# Patient Record
Sex: MALE | Race: BLACK OR AFRICAN AMERICAN | Employment: FULL TIME | URBAN - METROPOLITAN AREA
[De-identification: names, ages, dates, MRNs, and addresses within clinical notes are randomized per-mention and may not be internally consistent; named-entity substitution may affect disease eponyms.]

---

## 2018-04-15 ENCOUNTER — HOSPITAL ENCOUNTER (EMERGENCY)
Age: 48
Discharge: HOME | End: 2018-04-15
Attending: EMERGENCY MEDICINE
Payer: SELF-PAY

## 2018-04-15 ENCOUNTER — APPOINTMENT (OUTPATIENT)
Dept: GENERAL RADIOLOGY | Age: 48
End: 2018-04-15
Attending: EMERGENCY MEDICINE
Payer: SELF-PAY

## 2018-04-15 VITALS
SYSTOLIC BLOOD PRESSURE: 124 MMHG | OXYGEN SATURATION: 96 % | HEART RATE: 74 BPM | RESPIRATION RATE: 18 BRPM | DIASTOLIC BLOOD PRESSURE: 74 MMHG

## 2018-04-15 DIAGNOSIS — J45.20 MILD INTERMITTENT ASTHMATIC BRONCHITIS WITHOUT COMPLICATION: Primary | ICD-10-CM

## 2018-04-15 PROCEDURE — 94640 AIRWAY INHALATION TREATMENT: CPT

## 2018-04-15 PROCEDURE — 74011250637 HC RX REV CODE- 250/637: Performed by: EMERGENCY MEDICINE

## 2018-04-15 PROCEDURE — 96374 THER/PROPH/DIAG INJ IV PUSH: CPT

## 2018-04-15 PROCEDURE — 99285 EMERGENCY DEPT VISIT HI MDM: CPT

## 2018-04-15 PROCEDURE — 74011000250 HC RX REV CODE- 250: Performed by: EMERGENCY MEDICINE

## 2018-04-15 PROCEDURE — 77030029684 HC NEB SM VOL KT MONA -A

## 2018-04-15 PROCEDURE — 96361 HYDRATE IV INFUSION ADD-ON: CPT

## 2018-04-15 PROCEDURE — 74011250636 HC RX REV CODE- 250/636: Performed by: EMERGENCY MEDICINE

## 2018-04-15 PROCEDURE — 74011636637 HC RX REV CODE- 636/637: Performed by: EMERGENCY MEDICINE

## 2018-04-15 PROCEDURE — 71046 X-RAY EXAM CHEST 2 VIEWS: CPT

## 2018-04-15 RX ORDER — MAGNESIUM SULFATE HEPTAHYDRATE 500 MG/ML
2 INJECTION, SOLUTION INTRAMUSCULAR; INTRAVENOUS
Status: COMPLETED | OUTPATIENT
Start: 2018-04-15 | End: 2018-04-15

## 2018-04-15 RX ORDER — ALBUTEROL SULFATE 90 UG/1
1 AEROSOL, METERED RESPIRATORY (INHALATION)
Qty: 1 INHALER | Refills: 0 | Status: SHIPPED | OUTPATIENT
Start: 2018-04-15 | End: 2022-08-03

## 2018-04-15 RX ORDER — PREDNISONE 20 MG/1
60 TABLET ORAL
Status: COMPLETED | OUTPATIENT
Start: 2018-04-15 | End: 2018-04-15

## 2018-04-15 RX ORDER — PREDNISONE 50 MG/1
50 TABLET ORAL DAILY
Qty: 5 TAB | Refills: 0 | Status: SHIPPED | OUTPATIENT
Start: 2018-04-15 | End: 2018-04-20

## 2018-04-15 RX ORDER — ALBUTEROL SULFATE 0.83 MG/ML
2.5 SOLUTION RESPIRATORY (INHALATION)
Status: COMPLETED | OUTPATIENT
Start: 2018-04-15 | End: 2018-04-15

## 2018-04-15 RX ORDER — IPRATROPIUM BROMIDE AND ALBUTEROL SULFATE 2.5; .5 MG/3ML; MG/3ML
3 SOLUTION RESPIRATORY (INHALATION) ONCE
Status: COMPLETED | OUTPATIENT
Start: 2018-04-15 | End: 2018-04-15

## 2018-04-15 RX ORDER — GUAIFENESIN 100 MG/5ML
200 SOLUTION ORAL
Status: COMPLETED | OUTPATIENT
Start: 2018-04-15 | End: 2018-04-15

## 2018-04-15 RX ADMIN — ALBUTEROL SULFATE 2.5 MG: 2.5 SOLUTION RESPIRATORY (INHALATION) at 05:42

## 2018-04-15 RX ADMIN — ALBUTEROL SULFATE 2.5 MG: 2.5 SOLUTION RESPIRATORY (INHALATION) at 05:27

## 2018-04-15 RX ADMIN — IPRATROPIUM BROMIDE AND ALBUTEROL SULFATE 3 ML: .5; 3 SOLUTION RESPIRATORY (INHALATION) at 05:10

## 2018-04-15 RX ADMIN — PREDNISONE 60 MG: 20 TABLET ORAL at 05:10

## 2018-04-15 RX ADMIN — GUAIFENESIN 200 MG: 200 SOLUTION ORAL at 06:44

## 2018-04-15 RX ADMIN — SODIUM CHLORIDE 1000 ML: 900 INJECTION, SOLUTION INTRAVENOUS at 07:29

## 2018-04-15 RX ADMIN — MAGNESIUM SULFATE HEPTAHYDRATE 2 G: 500 INJECTION, SOLUTION INTRAMUSCULAR; INTRAVENOUS at 07:29

## 2018-04-15 NOTE — ED NOTES
Pt asleep; rouses easily to voice. Reports \"I feel good\". Respirations regular/non labored, skin warm/dry. Denies shortness of breath at this time.

## 2018-04-15 NOTE — ED NOTES
Assumed care of patient. No ss distress. Resting comfortably on stretcher, wife at side. Patient awoken for IV and fluid administration. States feeling better. Warm blanket provided. Will continue to monitor closely while awaiting further orders.

## 2018-04-15 NOTE — ED PROVIDER NOTES
EMERGENCY DEPARTMENT HISTORY AND PHYSICAL EXAM    5:00 AM      Date: 4/15/2018  Patient Name: Sue Ferrara    History of Presenting Illness     Chief Complaint   Patient presents with    Shortness of Breath         History Provided By: Patient    Chief Complaint: SOB/wheezing  Duration:  Days  Timing:  Acute  Location: n/a  Quality: n/a  Severity: Mild  Modifying Factors: No worsening or alleviating factors. Pt tried nothing for this PTA. Associated Symptoms: denies any other associated signs or symptoms      Additional History (Context): Sue Ferrara is a 51 y.o. male with No significant past medical history who presents with SOB/wheezing. Pt reports mild acute onset of SOB/wheezing yesterday at 9AM. No worsening or alleviating factors. Pt tried nothing for this PTA. Pt denies fevers, chills, chest pain, Hx of blood clots, recent long trips, Hx of asthma, cigarette abuse, fevers, chills, and any other Sx or complaints at this time. PCP: None    Current Outpatient Prescriptions   Medication Sig Dispense Refill    GUAIFENESIN (MUCINEX PO) Take  by mouth. Past History     Past Medical History:  History reviewed. No pertinent past medical history. Past Surgical History:  Past Surgical History:   Procedure Laterality Date    HX ORTHOPAEDIC      right lower extremity procedure       Family History:  History reviewed. No pertinent family history. Social History:  Social History   Substance Use Topics    Smoking status: Never Smoker    Smokeless tobacco: None    Alcohol use No       Allergies:  No Known Allergies      Review of Systems       Review of Systems   Constitutional: Negative. Negative for chills and fever. HENT: Negative. Eyes: Negative. Respiratory: Positive for shortness of breath and wheezing. Cardiovascular: Negative. Gastrointestinal: Negative. Endocrine: Negative. Genitourinary: Negative. Musculoskeletal: Negative. Skin: Negative. Allergic/Immunologic: Negative. Neurological: Negative. Hematological: Negative. Psychiatric/Behavioral: Negative. All other systems reviewed and are negative. Physical Exam     Visit Vitals    /77 (BP 1 Location: Left arm, BP Patient Position: At rest)    Pulse 74    Resp 22    SpO2 93%         Physical Exam   Constitutional: He is oriented to person, place, and time. He appears well-developed and well-nourished. No distress. HENT:   Head: Normocephalic. Mouth/Throat: Oropharynx is clear and moist.   Eyes: Conjunctivae and EOM are normal. Pupils are equal, round, and reactive to light. Neck: Normal range of motion. Neck supple. Cardiovascular: Normal rate, regular rhythm, normal heart sounds and intact distal pulses. No murmur heard. Pulmonary/Chest: Effort normal. No respiratory distress. He has wheezes. He has rhonchi. He has no rales. He exhibits no tenderness. Abdominal: Soft. Bowel sounds are normal. He exhibits no distension. There is no tenderness. There is no rebound. Musculoskeletal: Normal range of motion. He exhibits no edema or tenderness. Neurological: He is alert and oriented to person, place, and time. No cranial nerve deficit. He exhibits normal muscle tone. Coordination normal.   Skin: Skin is warm and dry. No rash noted. Psychiatric: He has a normal mood and affect. His behavior is normal. Judgment and thought content normal.   Nursing note and vitals reviewed. Diagnostic Study Results     Labs -  No results found for this or any previous visit (from the past 12 hour(s)). Radiologic Studies -   XR CHEST PA LAT   Final Result   IMPRESSION:     No evidence of acute pulmonary disease . Medical Decision Making   I am the first provider for this patient. I reviewed the vital signs, available nursing notes, past medical history, past surgical history, family history and social history.     Vital Signs-Reviewed the patient's vital signs. Records Reviewed: Nursing Notes (Time of Review: 5:00 AM)    ED Course: Progress Notes, Reevaluation, and Consults:  6:57 AM pt remains in room awaiting improvement of Sx's after treatment. Provider Notes (Medical Decision Making): asthma, bronchitis, Pneumonia, Viral Syndrome    Diagnosis     Clinical Impression:   1. Mild intermittent asthmatic bronchitis without complication        Disposition: TBD    Follow-up Information     None           Patient's Medications   Start Taking    No medications on file   Continue Taking    GUAIFENESIN (MUCINEX PO)    Take  by mouth. These Medications have changed    No medications on file   Stop Taking    BUTALBITAL-ACETAMINOPHEN-CAFFEINE (FIORICET) -40 MG PER TABLET    Take 1-2 tablets by mouth every 4 hours as needed for headache. Maximum dose 6 capsules daily. _______________________________    Attestations:  Scribe Attestation     Serita Osler acting as a scribe for and in the presence of Cheryl Frank MD      April 15, 2018 at 5:00 AM       Provider Attestation:      I personally performed the services described in the documentation, reviewed the documentation, as recorded by the scribe in my presence, and it accurately and completely records my words and actions. April 15, 2018 at 5:00 AM - Cheryl Frank MD    _______________________________    6:51 AM : Pt care transferred to Dr. Jaymie Rehman  ,ED provider. History of patient complaint(s), available diagnostic reports and current treatment plan has been discussed thoroughly. Bedside rounding on patient occured : yes .   Intended disposition of patient : TBD  Pending diagnostics reports and/or labs (please list): improvement of Sx's

## 2018-04-15 NOTE — ED NOTES
Verbal and bedside report given to Erma Dexter RN. Pt hx/complaint/findings/treatments/current plan of care have been discussed. Shift change performed at bedside; pt and spouse awre of staff change over and have no further questions at this time.

## 2018-04-15 NOTE — ED NOTES
7:00 AM : Pt care transferred from Dr. Wilner Nunn, ED provider. History of patient complaint(s), available diagnostic reports and current treatment plan has been discussed thoroughly. .  Intended disposition of patient : TBD  Pending diagnostics reports and/or labs (please list): Reeval    8:31 AM Pt reassessed and feeling better. Pt was taken off O2, if Sats remain reassuring, will discharge. I have reassessed the patient. Patient is feeling better and O2 sat reassuring. Patient will be prescribed Prednisone and Proair. Patient was discharged in stable condition. Patient is to return to emergency department if any new or worsening condition. Scribe Attestation     Devin De La Fuente acting as a scribe for and in the presence of Santy Ordaz DO     April 15, 2018 at 7:28 AM       Provider Attestation:      I personally performed the services described in the documentation, reviewed the documentation, as recorded by the scribe in my presence, and it accurately and completely records my words and actions.  April 15, 2018 at 7:28 AM - Santy Ordaz DO

## 2018-04-15 NOTE — DISCHARGE INSTRUCTIONS
Bronchitis: Care Instructions  Your Care Instructions    Bronchitis is inflammation of the bronchial tubes, which carry air to the lungs. The tubes swell and produce mucus, or phlegm. The mucus and inflamed bronchial tubes make you cough. You may have trouble breathing. Most cases of bronchitis are caused by viruses like those that cause colds. Antibiotics usually do not help and they may be harmful. Bronchitis usually develops rapidly and lasts about 2 to 3 weeks in otherwise healthy people. Follow-up care is a key part of your treatment and safety. Be sure to make and go to all appointments, and call your doctor if you are having problems. It's also a good idea to know your test results and keep a list of the medicines you take. How can you care for yourself at home? · Take all medicines exactly as prescribed. Call your doctor if you think you are having a problem with your medicine. · Get some extra rest.  · Take an over-the-counter pain medicine, such as acetaminophen (Tylenol), ibuprofen (Advil, Motrin), or naproxen (Aleve) to reduce fever and relieve body aches. Read and follow all instructions on the label. · Do not take two or more pain medicines at the same time unless the doctor told you to. Many pain medicines have acetaminophen, which is Tylenol. Too much acetaminophen (Tylenol) can be harmful. · Take an over-the-counter cough medicine that contains dextromethorphan to help quiet a dry, hacking cough so that you can sleep. Avoid cough medicines that have more than one active ingredient. Read and follow all instructions on the label. · Breathe moist air from a humidifier, hot shower, or sink filled with hot water. The heat and moisture will thin mucus so you can cough it out. · Do not smoke. Smoking can make bronchitis worse. If you need help quitting, talk to your doctor about stop-smoking programs and medicines. These can increase your chances of quitting for good.   When should you call for help? Call 911 anytime you think you may need emergency care. For example, call if:  ? · You have severe trouble breathing. ?Call your doctor now or seek immediate medical care if:  ? · You have new or worse trouble breathing. ? · You cough up dark brown or bloody mucus (sputum). ? · You have a new or higher fever. ? · You have a new rash. ? Watch closely for changes in your health, and be sure to contact your doctor if:  ? · You cough more deeply or more often, especially if you notice more mucus or a change in the color of your mucus. ? · You are not getting better as expected. Where can you learn more? Go to http://salome-amos.info/. Enter H333 in the search box to learn more about \"Bronchitis: Care Instructions. \"  Current as of: May 12, 2017  Content Version: 11.4  © 2910-1313 [employer]. Care instructions adapted under license by [employer] (which disclaims liability or warranty for this information). If you have questions about a medical condition or this instruction, always ask your healthcare professional. [address] any warranty or liability for your use of this information.

## 2022-01-03 ENCOUNTER — APPOINTMENT (OUTPATIENT)
Dept: GENERAL RADIOLOGY | Age: 52
End: 2022-01-03
Attending: PHYSICIAN ASSISTANT
Payer: COMMERCIAL

## 2022-01-03 ENCOUNTER — HOSPITAL ENCOUNTER (EMERGENCY)
Age: 52
Discharge: HOME | End: 2022-01-03
Attending: STUDENT IN AN ORGANIZED HEALTH CARE EDUCATION/TRAINING PROGRAM
Payer: COMMERCIAL

## 2022-01-03 VITALS
RESPIRATION RATE: 18 BRPM | OXYGEN SATURATION: 100 % | TEMPERATURE: 98.7 F | HEIGHT: 73 IN | WEIGHT: 280 LBS | SYSTOLIC BLOOD PRESSURE: 149 MMHG | DIASTOLIC BLOOD PRESSURE: 98 MMHG | BODY MASS INDEX: 37.11 KG/M2 | HEART RATE: 74 BPM

## 2022-01-03 DIAGNOSIS — R07.9 CHEST PAIN, UNSPECIFIED TYPE: Primary | ICD-10-CM

## 2022-01-03 DIAGNOSIS — I15.9 SECONDARY HYPERTENSION: ICD-10-CM

## 2022-01-03 DIAGNOSIS — U07.1 COVID-19: ICD-10-CM

## 2022-01-03 DIAGNOSIS — R77.8 ELEVATED TROPONIN: ICD-10-CM

## 2022-01-03 LAB
ALBUMIN SERPL-MCNC: 3.6 G/DL (ref 3.4–5)
ALBUMIN/GLOB SERPL: 0.9 {RATIO} (ref 0.8–1.7)
ALP SERPL-CCNC: 98 U/L (ref 45–117)
ALT SERPL-CCNC: 50 U/L (ref 16–61)
ANION GAP SERPL CALC-SCNC: 5 MMOL/L (ref 3–18)
AST SERPL-CCNC: 28 U/L (ref 10–38)
ATRIAL RATE: 72 BPM
BASOPHILS # BLD: 0 K/UL (ref 0–0.1)
BASOPHILS NFR BLD: 1 % (ref 0–2)
BILIRUB SERPL-MCNC: 0.5 MG/DL (ref 0.2–1)
BNP SERPL-MCNC: 9 PG/ML (ref 0–900)
BUN SERPL-MCNC: 14 MG/DL (ref 7–18)
BUN/CREAT SERPL: 13 (ref 12–20)
CALCIUM SERPL-MCNC: 8.6 MG/DL (ref 8.5–10.1)
CALCULATED P AXIS, ECG09: -9 DEGREES
CALCULATED R AXIS, ECG10: -49 DEGREES
CALCULATED T AXIS, ECG11: 52 DEGREES
CHLORIDE SERPL-SCNC: 108 MMOL/L (ref 100–111)
CO2 SERPL-SCNC: 26 MMOL/L (ref 21–32)
CREAT SERPL-MCNC: 1.12 MG/DL (ref 0.6–1.3)
DIAGNOSIS, 93000: NORMAL
DIFFERENTIAL METHOD BLD: ABNORMAL
EOSINOPHIL # BLD: 0.4 K/UL (ref 0–0.4)
EOSINOPHIL NFR BLD: 7 % (ref 0–5)
ERYTHROCYTE [DISTWIDTH] IN BLOOD BY AUTOMATED COUNT: 13.2 % (ref 11.6–14.5)
FLUAV RNA SPEC QL NAA+PROBE: NOT DETECTED
FLUBV RNA SPEC QL NAA+PROBE: NOT DETECTED
GLOBULIN SER CALC-MCNC: 4.2 G/DL (ref 2–4)
GLUCOSE SERPL-MCNC: 108 MG/DL (ref 74–99)
HCT VFR BLD AUTO: 47.6 % (ref 36–48)
HGB BLD-MCNC: 16.1 G/DL (ref 13–16)
IMM GRANULOCYTES # BLD AUTO: 0 K/UL (ref 0–0.04)
IMM GRANULOCYTES NFR BLD AUTO: 0 % (ref 0–0.5)
LYMPHOCYTES # BLD: 2.4 K/UL (ref 0.9–3.6)
LYMPHOCYTES NFR BLD: 44 % (ref 21–52)
MCH RBC QN AUTO: 29.4 PG (ref 24–34)
MCHC RBC AUTO-ENTMCNC: 33.8 G/DL (ref 31–37)
MCV RBC AUTO: 87 FL (ref 78–100)
MONOCYTES # BLD: 0.4 K/UL (ref 0.05–1.2)
MONOCYTES NFR BLD: 8 % (ref 3–10)
NEUTS SEG # BLD: 2.2 K/UL (ref 1.8–8)
NEUTS SEG NFR BLD: 41 % (ref 40–73)
NRBC # BLD: 0 K/UL (ref 0–0.01)
NRBC BLD-RTO: 0 PER 100 WBC
P-R INTERVAL, ECG05: 154 MS
PLATELET # BLD AUTO: 221 K/UL (ref 135–420)
PMV BLD AUTO: 11.2 FL (ref 9.2–11.8)
POTASSIUM SERPL-SCNC: 4 MMOL/L (ref 3.5–5.5)
PROT SERPL-MCNC: 7.8 G/DL (ref 6.4–8.2)
Q-T INTERVAL, ECG07: 406 MS
QRS DURATION, ECG06: 106 MS
QTC CALCULATION (BEZET), ECG08: 444 MS
RBC # BLD AUTO: 5.47 M/UL (ref 4.35–5.65)
SARS-COV-2, COV2: DETECTED
SODIUM SERPL-SCNC: 139 MMOL/L (ref 136–145)
TROPONIN-HIGH SENSITIVITY: 226 NG/L (ref 0–78)
TROPONIN-HIGH SENSITIVITY: 227 NG/L (ref 0–78)
VENTRICULAR RATE, ECG03: 72 BPM
WBC # BLD AUTO: 5.5 K/UL (ref 4.6–13.2)

## 2022-01-03 PROCEDURE — 83880 ASSAY OF NATRIURETIC PEPTIDE: CPT

## 2022-01-03 PROCEDURE — 74011000250 HC RX REV CODE- 250: Performed by: STUDENT IN AN ORGANIZED HEALTH CARE EDUCATION/TRAINING PROGRAM

## 2022-01-03 PROCEDURE — 84484 ASSAY OF TROPONIN QUANT: CPT

## 2022-01-03 PROCEDURE — 99284 EMERGENCY DEPT VISIT MOD MDM: CPT

## 2022-01-03 PROCEDURE — 80053 COMPREHEN METABOLIC PANEL: CPT

## 2022-01-03 PROCEDURE — 85025 COMPLETE CBC W/AUTO DIFF WBC: CPT

## 2022-01-03 PROCEDURE — 71045 X-RAY EXAM CHEST 1 VIEW: CPT

## 2022-01-03 PROCEDURE — 87636 SARSCOV2 & INF A&B AMP PRB: CPT

## 2022-01-03 PROCEDURE — 93005 ELECTROCARDIOGRAM TRACING: CPT

## 2022-01-03 PROCEDURE — 74011250637 HC RX REV CODE- 250/637: Performed by: STUDENT IN AN ORGANIZED HEALTH CARE EDUCATION/TRAINING PROGRAM

## 2022-01-03 RX ORDER — AMLODIPINE BESYLATE 5 MG/1
5 TABLET ORAL DAILY
Qty: 14 TABLET | Refills: 0 | Status: SHIPPED | OUTPATIENT
Start: 2022-01-03 | End: 2022-01-17

## 2022-01-03 RX ORDER — GUAIFENESIN 100 MG/5ML
81 LIQUID (ML) ORAL DAILY
Qty: 14 TABLET | Refills: 0 | Status: SHIPPED | OUTPATIENT
Start: 2022-01-03 | End: 2022-01-17

## 2022-01-03 RX ORDER — ASPIRIN 325 MG
325 TABLET ORAL
Status: COMPLETED | OUTPATIENT
Start: 2022-01-03 | End: 2022-01-03

## 2022-01-03 RX ADMIN — LIDOCAINE HYDROCHLORIDE 40 ML: 20 SOLUTION ORAL; TOPICAL at 13:13

## 2022-01-03 RX ADMIN — ASPIRIN 325 MG ORAL TABLET 325 MG: 325 PILL ORAL at 13:13

## 2022-01-03 NOTE — Clinical Note
[address] [facility] EMERGENCY DEPT  [id number] [id number]  [phone]    Work/School Note    Date: 1/3/2022     To Whom It May concern:    Isak Mays was evaluated by the following provider(s):  Attending Provider: Grace Silverio DO.   COVID19 virus is suspected. Per the CDC guidelines we recommend home isolation until the following conditions are all met:    1. At least five days have passed since symptoms first appeared and/or had a close exposure,   2. After home isolation for five days, wearing a mask around others for the next five days,  3. At least 24 have passed since last fever without the use of fever-reducing medications and  4.  Symptoms (eg cough, shortness of breath) have improved      Sincerely,          Akash Lopez DO

## 2022-01-03 NOTE — ED NOTES
Pt chest pain is no longer present at this time. Pt troponin noted to be elevated, per provider, repeat Trop & EKG at 2 hour krystal which will be 1240.

## 2022-01-03 NOTE — Clinical Note
[address] [facility] EMERGENCY DEPT  [id number] [id number] [phone]    Work/School Note    Date: 1/3/2022     To Whom It May concern:    Cosme Novoa was evaluated by the following provider(s):  Attending Provider: Zia Kilgore DO.   COVID19 virus is suspected. Per the CDC guidelines we recommend home isolation until the following conditions are all met:    1. At least five days have passed since symptoms first appeared and/or had a close exposure,   2. After home isolation for five days, wearing a mask around others for the next five days,  3. At least 24 have passed since last fever without the use of fever-reducing medications and  4.  Symptoms (eg cough, shortness of breath) have improved      Sincerely,          Akash Lopez DO

## 2022-01-03 NOTE — ED NOTES
Repeat EKG performed and repeat troponin drawn. Pt updated on poc. He reports chest pain resolved. Call bell in reach.

## 2022-01-03 NOTE — DISCHARGE INSTRUCTIONS
You tested positive for COVID-19. Additionally, you had chest pain that resolved in the ER. Your heart enzymes were mildly elevated therefore we will start you on a baby aspirin daily and also a blood pressure medication until you follow-up with a cardiologist.  Instructions to follow-up with a cardiologist at them provided to you. Return to the ER immediately if your chest pain returns or worsens or you have any other complaints. Please take isolation/quarantine precautions as listed below.

## 2022-01-03 NOTE — ED TRIAGE NOTES
Pt reports feeling like \"I was catching a cold on Thursday\", reports left sided chest pain that increases with palpation. Chest pain does not radiate. Pt reports dizziness, headache, neck pain. BP noted to be elevated at this time, provider at bedside. Pt denies fever. Pt denies n/v/d, abd pain. Pt is a/o x 4 with no neuro deficits noted. Pt is in gown, EKG has been performed and given to provider. Pt updated on poc.

## 2022-01-03 NOTE — ED NOTES
EMERGENCY DEPARTMENT HISTORY AND PHYSICAL EXAM      Patient Name: Harrison Blevins    History of Presenting Illness     HPI:     79-year-old male with no medical history presents to the ED for evaluation of URI-like symptoms that have been ongoing since Thursday including sinus and chest congestion, runny nose, cough, shortness of breath. Additionally, he had an episode of substernal chest pain described as both dull and sharp that began on Thursday, waxed and waned throughout the weekend and yesterday it was there constantly. Aggravated with palpation of his right anterior chest.  No alleviating factors. Denies any radiating pain, diaphoresis, nausea, vomiting, lightheadedness, back pain. Of note, patient does not use any tobacco products or illicit drugs and has no significant family history of cardiac disease. He states he takes no medications and [facility] so [facility] can assess him to start him on antihypertensives. PCP: None    No current facility-administered medications on file prior to encounter. Current Outpatient Medications on File Prior to Encounter   Medication Sig Dispense Refill    GUAIFENESIN (MUCINEX PO) Take  by mouth.  albuterol (PROAIR HFA) 90 mcg/actuation inhaler Take 1 Puff by inhalation every six (6) hours as needed for Wheezing. 1 Inhaler 0       Past History     Past Medical History:  No past medical history on file. Past Surgical History:  Past Surgical History:   Procedure Laterality Date    HX ORTHOPAEDIC      right lower extremity procedure       Family History:  No family history on file. Social History:  Social History     Tobacco Use    Smoking status: Never Smoker    Smokeless tobacco: Not on file   Substance Use Topics    Alcohol use: No    Drug use: No       Allergies:  No Known Allergies    Review of Nursing Notes: I have reviewed the relevant nursing notes that were available at the time of this entry. Portions of the Family and Social history, as well as medications and allergies, may have been entered into my documentation by nursing or other ancillary staff; I have confirmed and may have supplemented that information to the best of my ability at the time the note was reviewed. There are some disagreements between the nursing notes and my evaluation at times. Some of the above referenced nursing documentation appears in my note after completion of my review. Review of Systems     CONSTITUTIONAL: Denies fevers, chills, sweats, or weight changes. EYES: Denies any visual changes or symptoms  HENT: Denies headaches, changes to hearing, sore throat, dysphagia, or change in voice. CV: Denies palpitations. Lungs/Chest: Denies wheezing, or cough. GI: Denies nausea, vomiting, diarrhea, constipation, abdominal pain, hematochezia, or melena. : Denies urinary retention or incontinence. No genital discharge. No dysuria. MSK: Denies myalgias or arthralgias. NEURO: Denies chronic headaches or seizures. No numbness, tingling or weakness. PSYCHIATRIC: Denies problems with mood disturbance and anxiety. DERMATOLOGIC: Denies any rashes or skin changes. Physical Exam     General: In no apparent distress. Well-nourished/well-developed. Head/Neck: Normocephalic, atraumatic. Nontender midline neck, normal ROM. EENT: PERRLA. EOM intact bilaterally. Oropharyngeal mucosa is moist, lesions or erythema. No nasal discharge. Cardiovascular: RRR, no murmurs, gallops, or rubs. Peripheral pulses normal and intact in BUE and BLE. Lungs/Chest: CTAB, no wheezing, rhonchi, or rales. Point tenderness to right costosternal junction. Abdomen: No distention. No organomegaly. No rebound, rigidity, or guarding. Nontender. Extremities/Skin: Warm distal extremities No deformities. No edema. No rashes. Neuro: A&O x 3. Grossly intact sensations and motor function in upper and lower extremities bilaterally.   Psych: Appropriate mood and affect. Diagnostic Study Results     Labs -     Recent Results (from the past 12 hour(s))   EKG, 12 LEAD, INITIAL    Collection Time: 01/03/22 12:19 PM   Result Value Ref Range    Ventricular Rate 72 BPM    Atrial Rate 72 BPM    P-R Interval 154 ms    QRS Duration 106 ms    Q-T Interval 406 ms    QTC Calculation (Bezet) 444 ms    Calculated P Axis -9 degrees    Calculated R Axis -49 degrees    Calculated T Axis 52 degrees    Diagnosis       Normal sinus rhythm  Incomplete right bundle branch block  Left anterior fascicular block  Minimal voltage criteria for LVH, may be normal variant  Abnormal ECG  When compared with ECG of 27-JUN-2016 08:40,  Nonspecific T wave abnormality has replaced inverted T waves in Inferior   leads  Nonspecific T wave abnormality now evident in Lateral leads  Confirmed by Filiberto Louise MD, --- ([id number]) on 1/3/2022 1:56:02 PM     CBC WITH AUTOMATED DIFF    Collection Time: 01/03/22 12:40 PM   Result Value Ref Range    WBC 5.5 4.6 - 13.2 K/uL    RBC 5.47 4.35 - 5.65 M/uL    HGB 16.1 (H) 13.0 - 16.0 g/dL    HCT 47.6 36.0 - 48.0 %    MCV 87.0 78.0 - 100.0 FL    MCH 29.4 24.0 - 34.0 PG    MCHC 33.8 31.0 - 37.0 g/dL    RDW 13.2 11.6 - 14.5 %    PLATELET 500 888 - 715 K/uL    MPV 11.2 9.2 - 11.8 FL    NRBC 0.0 0  WBC    ABSOLUTE NRBC 0.00 0.00 - 0.01 K/uL    NEUTROPHILS 41 40 - 73 %    LYMPHOCYTES 44 21 - 52 %    MONOCYTES 8 3 - 10 %    EOSINOPHILS 7 (H) 0 - 5 %    BASOPHILS 1 0 - 2 %    IMMATURE GRANULOCYTES 0 0.0 - 0.5 %    ABS. NEUTROPHILS 2.2 1.8 - 8.0 K/UL    ABS. LYMPHOCYTES 2.4 0.9 - 3.6 K/UL    ABS. MONOCYTES 0.4 0.05 - 1.2 K/UL    ABS. EOSINOPHILS 0.4 0.0 - 0.4 K/UL    ABS. BASOPHILS 0.0 0.0 - 0.1 K/UL    ABS. IMM.  GRANS. 0.0 0.00 - 0.04 K/UL    DF AUTOMATED     METABOLIC PANEL, COMPREHENSIVE    Collection Time: 01/03/22 12:40 PM   Result Value Ref Range    Sodium 139 136 - 145 mmol/L    Potassium 4.0 3.5 - 5.5 mmol/L    Chloride 108 100 - 111 mmol/L    CO2 26 21 - 32 mmol/L Anion gap 5 3.0 - 18 mmol/L    Glucose 108 (H) 74 - 99 mg/dL    BUN 14 7.0 - 18 MG/DL    Creatinine 1.12 0.6 - 1.3 MG/DL    BUN/Creatinine ratio 13 12 - 20      GFR est AA >60 >60 ml/min/1.73m2    GFR est non-AA >60 >60 ml/min/1.73m2    Calcium 8.6 8.5 - 10.1 MG/DL    Bilirubin, total 0.5 0.2 - 1.0 MG/DL    ALT (SGPT) 50 16 - 61 U/L    AST (SGOT) 28 10 - 38 U/L    Alk. phosphatase 98 45 - 117 U/L    Protein, total 7.8 6.4 - 8.2 g/dL    Albumin 3.6 3.4 - 5.0 g/dL    Globulin 4.2 (H) 2.0 - 4.0 g/dL    A-G Ratio 0.9 0.8 - 1.7     NT-PRO BNP    Collection Time: 01/03/22 12:40 PM   Result Value Ref Range    NT pro-BNP 9 0 - 900 PG/ML   TROPONIN-HIGH SENSITIVITY    Collection Time: 01/03/22 12:40 PM   Result Value Ref Range    Troponin-High Sensitivity 227 (HH) 0 - 78 ng/L   COVID-19 WITH INFLUENZA A/B    Collection Time: 01/03/22 12:45 PM   Result Value Ref Range    SARS-CoV-2 Detected (AA) NOTD      Influenza A by PCR Not detected NOTD      Influenza B by PCR Not detected NOTD     TROPONIN-HIGH SENSITIVITY    Collection Time: 01/03/22  2:40 PM   Result Value Ref Range    Troponin-High Sensitivity 226 (HH) 0 - 78 ng/L       Radiologic Studies -   XR CHEST PORT   Final Result      Slightly prominent perihilar interstitium may be simulated by low lung volumes,   but mild infiltrate/edema not excluded. CT Results  (Last 48 hours)    None        CXR Results  (Last 48 hours)               01/03/22 1246  XR CHEST PORT Final result    Impression:      Slightly prominent perihilar interstitium may be simulated by low lung volumes,   but mild infiltrate/edema not excluded. Narrative:  EXAMINATION: Chest single view       INDICATION: Shortness of breath, hypertension       COMPARISON: 4/15/2018       FINDINGS: Single frontal view of the chest obtained. Mediastinal silhouette   normal in size. Slightly prominent perihilar interstitium. No confluent   consolidation. No evidence of pneumothorax.  No acute osseous findings. Low lung   volumes and underpenetration limits evaluation. Medical Decision Making     I am the first provider for this patient. Vital Signs- I personally reviewed and interpreted the patient's vital signs. Patient Vitals for the past 12 hrs:   Temp Pulse Resp BP SpO2   01/03/22 1234  78 12 (!) 154/95 100 %   01/03/22 1232 98.7 °F (37.1 °C) 75 20 (!) 163/102 99 %     EKG interpretation: Normal sinus rhythm. Incomplete RBBB. Rate 72. No STEMI. Nonspecific ST/T changes. No significant change from prior EKG. EKG read and interpreted by ED physician at 21 [phone]    Records Reviewed: I reviewed the patient's records to interpret any previous medical data available to me including EKGs, previous medical records, previous images, previous labs. Provider Notes (Medical Decision Making):     58-year-old male presents to the ED for URI-like symptoms with chest congestion, runny nose, cough and shortness of breath since Thursday. Additionally, he has been having waxing/waning substernal chest pressure since Thursday however has been constant since yesterday. Nonexertional.     Upon my examination, patient is afebrile and overall well appearing. Hemodynamically normal. Neurovascularly intact. Point tenderness to right anterior chest wall at costosternal junction. Given today's clinical picture, my differential diagnoses indlude: Musculoskeletal pain, viral illness, COVID-19, pneumonia, pneumothorax, ACS, arrhythmia, anemia, dehydration, anxiety, electrolyte abnormality. To further refine my diagnosis, I will order EKG, CXR, labs including cardiac enzymes, COVID-19 swab. ED Course:     ED Course as of 01/03/22 1555   Mon Jan 03, 2022   1335 Troponin 227. Plan for delta troponin with EKG at 1440 and cardiology consult. At this point, patient's pain has resolved. Resting comfortably. He was updated on the results, no further questions.     HEART score: 3 [EK]   1457 Repeat EKG is normal sinus rhythm. Rate 65. Incomplete RBBB, normal intervals. No acute ischemic changes, no significant change from prior EKG. Patient remains chest pain-free at this point. Repeat troponin pending. [EK]   1457 Repeat troponin 226. Continues to be chest pain-free. Cardiology consulted. COVID-19 test just came back positive. [EK]   6109 Discussed with cardiology, recommend starting a baby aspirin and antihypertensive and close follow-up with cardiology. I think this is reasonable as patient is chest pain-free and very reliable. He will follow up with the [address] [place] [address]. Patient remained stable throughout their ED course. I discussed relevant findings with patient. My plan is to discharge home with return precautions and PCP follow-up within two days. Additional verbal discharge instructions were given and discussed with the patient. Patient expressed understanding, agrees to plan, and all of their questions were answered.  [EK]      ED Course User Index  [EK] Keshia Dowell DO     Leann Pump, DO  Date: 1/3/2022

## 2022-01-04 ENCOUNTER — PATIENT OUTREACH (OUTPATIENT)
Dept: CASE MANAGEMENT | Age: 52
End: 2022-01-04

## 2022-01-04 LAB
ATRIAL RATE: 65 BPM
CALCULATED P AXIS, ECG09: -6 DEGREES
CALCULATED R AXIS, ECG10: -51 DEGREES
CALCULATED T AXIS, ECG11: 51 DEGREES
DIAGNOSIS, 93000: NORMAL
P-R INTERVAL, ECG05: 172 MS
Q-T INTERVAL, ECG07: 420 MS
QRS DURATION, ECG06: 108 MS
QTC CALCULATION (BEZET), ECG08: 436 MS
VENTRICULAR RATE, ECG03: 65 BPM

## 2022-01-04 NOTE — PROGRESS NOTES
Date/Time:  1/4/2022 1:44 PM   Call within 2 business days of discharge: Yes   Attempted to reach patient by telephone. Left HIPPA compliant message requesting a return call. Will attempt to reach patient again.

## 2022-01-05 ENCOUNTER — PATIENT OUTREACH (OUTPATIENT)
Dept: CASE MANAGEMENT | Age: 52
End: 2022-01-05

## 2022-01-05 NOTE — PROGRESS NOTES
Patient contacted regarding COVID-19 diagnosis. Discussed COVID-19 related testing which was available at this time. Test results were positive. Patient informed of results, if available? yes. Ambulatory Care Manager contacted the patient by telephone to perform post discharge assessment. Call within 2 business days of discharge: Yes Verified name and  with patient as identifiers. Provided introduction to self, and explanation of the CTN/ACM role, and reason for call due to risk factors for infection and/or exposure to COVID-19. Symptoms reviewed with patient who verbalized the following symptoms: cough, shortness of breath, sinus congestion and runny nose        Due to no new or worsening symptoms encounter was not routed to provider for escalation. Discussed follow-up appointments. If no appointment was previously scheduled, appointment scheduling offered:  no. [address] follow up appointment(s): No future appointments. Non-[facility] follow up appointment(s): Patient is attempting to change appointment date at [place]    Interventions to address risk factors: Obtained and reviewed discharge summary and/or continuity of care documents     Advance Care Planning:   Does patient have an Advance Directive: not on file. Educated patient about risk for severe COVID-19 due to risk factors according to CDC guidelines. ACM reviewed discharge instructions, medical action plan and red flag symptoms with the patient who verbalized understanding. Discussed COVID vaccination status: yes. Education provided on COVID-19 vaccination as appropriate. Discussed exposure protocols and quarantine with CDC Guidelines. Patient was given an opportunity to verbalize any questions and concerns and agrees to contact ACM or health care provider for questions related to their healthcare. Reviewed and educated patient on any new and changed medications related to discharge diagnosis     Was patient discharged with a pulse oximeter?  no Discussed and confirmed pulse oximeter discharge instructions and when to notify provider or seek emergency care. ACM provided contact information. Plan for follow-up call in 5-7 days based on severity of symptoms and risk factors.

## 2022-01-12 ENCOUNTER — PATIENT OUTREACH (OUTPATIENT)
Dept: CASE MANAGEMENT | Age: 52
End: 2022-01-12

## 2022-01-12 NOTE — PROGRESS NOTES
Patient resolved from [address] Transitions episode on 1/12/22. Discussed COVID-19 related testing which was NA at this time. Test results were NA. Patient informed of results, if available? Patient was informed of test results- negative- on 1/4/22     Patient/family has been provided the following resources and education related to COVID-19:                         Signs, symptoms and red flags related to COVID-19            CDC exposure and quarantine guidelines            [facility] exposure contact - [phone]            Contact for their local Department of Health                 Patient currently reports that the following symptoms have improved:  no symptoms. No further outreach scheduled with this CTN/ACM/LPN/HC/ MA. Episode of Care resolved. Patient has this CTN/ACM/LPN/HC/MA contact information if future needs arise.

## 2022-08-03 ENCOUNTER — HOSPITAL ENCOUNTER (EMERGENCY)
Age: 52
Discharge: HOME | End: 2022-08-03
Attending: EMERGENCY MEDICINE
Payer: COMMERCIAL

## 2022-08-03 ENCOUNTER — APPOINTMENT (OUTPATIENT)
Dept: VASCULAR SURGERY | Age: 52
End: 2022-08-03
Attending: EMERGENCY MEDICINE
Payer: COMMERCIAL

## 2022-08-03 VITALS
HEIGHT: 76 IN | TEMPERATURE: 98.5 F | DIASTOLIC BLOOD PRESSURE: 78 MMHG | WEIGHT: 310 LBS | OXYGEN SATURATION: 97 % | BODY MASS INDEX: 37.75 KG/M2 | RESPIRATION RATE: 18 BRPM | SYSTOLIC BLOOD PRESSURE: 123 MMHG | HEART RATE: 83 BPM

## 2022-08-03 DIAGNOSIS — S80.11XA CONTUSION OF RIGHT LEG, INITIAL ENCOUNTER: Primary | ICD-10-CM

## 2022-08-03 LAB
ALBUMIN SERPL-MCNC: 3.8 G/DL (ref 3.4–5)
ALBUMIN/GLOB SERPL: 1 {RATIO} (ref 0.8–1.7)
ALP SERPL-CCNC: 117 U/L (ref 45–117)
ALT SERPL-CCNC: 52 U/L (ref 16–61)
ANION GAP SERPL CALC-SCNC: 3 MMOL/L (ref 3–18)
AST SERPL-CCNC: 40 U/L (ref 10–38)
BASOPHILS # BLD: 0.1 K/UL (ref 0–0.1)
BASOPHILS NFR BLD: 1 % (ref 0–2)
BILIRUB SERPL-MCNC: 0.5 MG/DL (ref 0.2–1)
BUN SERPL-MCNC: 20 MG/DL (ref 7–18)
BUN/CREAT SERPL: 14 (ref 12–20)
CALCIUM SERPL-MCNC: 8.8 MG/DL (ref 8.5–10.1)
CHLORIDE SERPL-SCNC: 109 MMOL/L (ref 100–111)
CO2 SERPL-SCNC: 30 MMOL/L (ref 21–32)
CREAT SERPL-MCNC: 1.38 MG/DL (ref 0.6–1.3)
D DIMER PPP FEU-MCNC: 0.51 UG/ML(FEU)
DIFFERENTIAL METHOD BLD: ABNORMAL
EOSINOPHIL # BLD: 0.4 K/UL (ref 0–0.4)
EOSINOPHIL NFR BLD: 5 % (ref 0–5)
ERYTHROCYTE [DISTWIDTH] IN BLOOD BY AUTOMATED COUNT: 13.5 % (ref 11.6–14.5)
GLOBULIN SER CALC-MCNC: 3.7 G/DL (ref 2–4)
GLUCOSE SERPL-MCNC: 105 MG/DL (ref 74–99)
HCT VFR BLD AUTO: 42.7 % (ref 36–48)
HGB BLD-MCNC: 14.5 G/DL (ref 13–16)
IMM GRANULOCYTES # BLD AUTO: 0 K/UL (ref 0–0.04)
IMM GRANULOCYTES NFR BLD AUTO: 0 % (ref 0–0.5)
LYMPHOCYTES # BLD: 2.6 K/UL (ref 0.9–3.6)
LYMPHOCYTES NFR BLD: 34 % (ref 21–52)
MCH RBC QN AUTO: 29.4 PG (ref 24–34)
MCHC RBC AUTO-ENTMCNC: 34 G/DL (ref 31–37)
MCV RBC AUTO: 86.6 FL (ref 78–100)
MONOCYTES # BLD: 0.5 K/UL (ref 0.05–1.2)
MONOCYTES NFR BLD: 7 % (ref 3–10)
NEUTS SEG # BLD: 4 K/UL (ref 1.8–8)
NEUTS SEG NFR BLD: 53 % (ref 40–73)
NRBC # BLD: 0 K/UL (ref 0–0.01)
NRBC BLD-RTO: 0 PER 100 WBC
PLATELET # BLD AUTO: 228 K/UL (ref 135–420)
PMV BLD AUTO: 12 FL (ref 9.2–11.8)
POTASSIUM SERPL-SCNC: 4 MMOL/L (ref 3.5–5.5)
PROT SERPL-MCNC: 7.5 G/DL (ref 6.4–8.2)
RBC # BLD AUTO: 4.93 M/UL (ref 4.35–5.65)
SODIUM SERPL-SCNC: 142 MMOL/L (ref 136–145)
WBC # BLD AUTO: 7.6 K/UL (ref 4.6–13.2)

## 2022-08-03 PROCEDURE — 99284 EMERGENCY DEPT VISIT MOD MDM: CPT

## 2022-08-03 PROCEDURE — 85025 COMPLETE CBC W/AUTO DIFF WBC: CPT

## 2022-08-03 PROCEDURE — 85379 FIBRIN DEGRADATION QUANT: CPT

## 2022-08-03 PROCEDURE — 93971 EXTREMITY STUDY: CPT

## 2022-08-03 PROCEDURE — 80053 COMPREHEN METABOLIC PANEL: CPT

## 2022-08-03 RX ORDER — GUAIFENESIN 100 MG/5ML
81 LIQUID (ML) ORAL DAILY
COMMUNITY

## 2022-08-03 RX ORDER — IBUPROFEN 600 MG/1
600 TABLET ORAL
Qty: 30 TABLET | Refills: 0 | Status: SHIPPED | OUTPATIENT
Start: 2022-08-03

## 2022-08-03 NOTE — ED TRIAGE NOTES
Pt c/o \"knots\" to right lower leg with intermittent swelling to the lower leg after striking leg on tow hitch ~3 weeks ago. Pt concerned he could have a blood clot. Denies leg pain.

## 2022-08-04 NOTE — ED PROVIDER NOTES
HPI 69-year-old male who accidentally hit his right lower leg against a trailer hitch 3 weeks ago. Since that time his right leg has become mildly swollen and tender over his mid medial anterior leg. Normal pulses since his distal to injury. Patient concerned of having a DVT. History reviewed. No pertinent past medical history. Past Surgical History:   Procedure Laterality Date    HX APPENDECTOMY      HX ORTHOPAEDIC      leg lower extremity procedure         History reviewed. No pertinent family history. Social History     Socioeconomic History    Marital status: LEGALLY      Spouse name: Not on file    Number of children: Not on file    Years of education: Not on file    Highest education level: Not on file   Occupational History    Not on file   Tobacco Use    Smoking status: Never    Smokeless tobacco: Not on file   Substance and Sexual Activity    Alcohol use: Yes     Comment: rare    Drug use: No    Sexual activity: Not on file   Other Topics Concern    Not on file   Social History Narrative    Not on file     Social Determinants of Health     Financial Resource Strain: Not on file   Food Insecurity: Not on file   Transportation Needs: Not on file   Physical Activity: Not on file   Stress: Not on file   Social Connections: Not on file   Intimate Partner Violence: Not on file   Housing Stability: Not on file         ALLERGIES: Patient has no known allergies. Review of Systems   Constitutional: Negative. HENT: Negative. Eyes: Negative. Respiratory: Negative. Cardiovascular: Negative. Gastrointestinal: Negative. Endocrine: Negative. Genitourinary: Negative. Musculoskeletal:  Positive for arthralgias (right lower leg). Skin: Negative. Allergic/Immunologic: Negative. Neurological: Negative. Hematological: Negative. Psychiatric/Behavioral: Negative. All other systems reviewed and are negative.     Vitals:    08/03/22 1917   BP: (!) 141/85   Pulse: 83 Resp: 18   Temp: 98.5 °F (36.9 °C)   SpO2: 98%   Weight: 140.6 kg (310 lb)   Height: 6' 4\" (1.93 m)            Physical Exam  Vitals and nursing note reviewed. Constitutional:       General: He is not in acute distress. Appearance: He is well-developed. HENT:      Head: Normocephalic. Eyes:      Conjunctiva/sclera: Conjunctivae normal.      Pupils: Pupils are equal, round, and reactive to light. Cardiovascular:      Rate and Rhythm: Normal rate and regular rhythm. Heart sounds: Normal heart sounds. No murmur heard. Pulmonary:      Effort: Pulmonary effort is normal. No respiratory distress. Breath sounds: Normal breath sounds. No wheezing or rales. Chest:      Chest wall: No tenderness. Abdominal:      General: Bowel sounds are normal. There is no distension. Palpations: Abdomen is soft. Tenderness: There is no abdominal tenderness. There is no rebound. Musculoskeletal:         General: No tenderness. Normal range of motion. Cervical back: Normal range of motion and neck supple. Right lower leg: Edema (with tenderness over mid anterior surface with palpation) present. Skin:     General: Skin is warm and dry. Findings: No rash. Neurological:      Mental Status: He is alert and oriented to person, place, and time. Cranial Nerves: No cranial nerve deficit. Motor: No abnormal muscle tone. Coordination: Coordination normal.   Psychiatric:         Behavior: Behavior normal.         Thought Content:  Thought content normal.         Judgment: Judgment normal.        MDM     Amount and/or Complexity of Data Reviewed  Tests in the radiology section of CPT®: ordered and reviewed    Risk of Complications, Morbidity, and/or Mortality  Presenting problems: high  Diagnostic procedures: high  Management options: moderate      Procedures    Differential diagnosis: DVT, contusion of the leg, musculoskeletal pain    Doppler study: Interpreted by me    Diagnosis:      Disposition:      Dictation disclaimer:  Please note that this dictation was completed with [employer], the computer voice recognition software. Quite often unanticipated grammatical, syntax, homophones, and other interpretive errors are inadvertently transcribed by the computer software. Please disregard these errors. Please excuse any errors that have escaped final proofreading.

## 2022-08-04 NOTE — PROGRESS NOTES
Discharge instructions given to patient. Follow up information provided. Prescriptions given as ordered. Verbalized understanding.